# Patient Record
Sex: MALE | Race: BLACK OR AFRICAN AMERICAN
[De-identification: names, ages, dates, MRNs, and addresses within clinical notes are randomized per-mention and may not be internally consistent; named-entity substitution may affect disease eponyms.]

---

## 2019-10-22 ENCOUNTER — HOSPITAL ENCOUNTER (EMERGENCY)
Dept: HOSPITAL 72 - EMR | Age: 84
Discharge: NURSING, REHAB OR LONG-TERM CARE | End: 2019-10-22
Payer: MEDICARE

## 2019-10-22 VITALS — DIASTOLIC BLOOD PRESSURE: 51 MMHG | SYSTOLIC BLOOD PRESSURE: 127 MMHG

## 2019-10-22 VITALS — SYSTOLIC BLOOD PRESSURE: 154 MMHG | DIASTOLIC BLOOD PRESSURE: 76 MMHG

## 2019-10-22 VITALS — HEIGHT: 68 IN | BODY MASS INDEX: 21.98 KG/M2 | WEIGHT: 145 LBS

## 2019-10-22 VITALS — SYSTOLIC BLOOD PRESSURE: 131 MMHG | DIASTOLIC BLOOD PRESSURE: 57 MMHG
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
## 2019-10-22 DIAGNOSIS — M54.5: Primary | ICD-10-CM

## 2019-10-22 DIAGNOSIS — I69.854: ICD-10-CM

## 2019-10-22 DIAGNOSIS — T14.90XA: ICD-10-CM

## 2019-10-22 DIAGNOSIS — I10: ICD-10-CM

## 2019-10-22 DIAGNOSIS — R07.9: ICD-10-CM
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
## 2019-10-22 PROCEDURE — 72020 X-RAY EXAM OF SPINE 1 VIEW: CPT

## 2019-10-22 PROCEDURE — 99284 EMERGENCY DEPT VISIT MOD MDM: CPT

## 2019-10-22 NOTE — NUR
ED Nurse Note:



PT BROUGHT IN TO ER BY [facility] FROM [facility]. AOX4. PER EMS, FACILITY 
CALLED DUE TO A FALL X 2 DAYS AGO. PT STATES HE TRIPPED AND FELL AND LANDED ON 
HIS BACK. PT DENIES HEAD TRAUMA/LOC. PT DENIES ANY PAIN. ON ASSESSMENT, SKIN IS 
CLEAN, DRY, AND INTACT. NO BRUISING OR OBVIOUS DEFORMITIES NOTED. FULL ROM OF 
ALL EXTREMITIES.

## 2019-10-22 NOTE — NUR
ED Nurse Note:



[facility] AT BEDSIDE. REPORT GIVEN TO EMS. DISCHARGE PAPERWORK EXPLAINED TO PT. 
PT VERBALIZES UNDERSTANDING AND ALL QUESTIONS ANSWERED. DISCHARGE PAPERWORK 
GIVEN TO EMS. PT TAKEN BACK TO [facility]IA AMBULANCE WITH ALL BELONGINGS 
ACCOMPANIED BY EMS. VSS.

## 2019-10-22 NOTE — DIAGNOSTIC IMAGING REPORT
Indication: Reason For Exam: PAIN

 

Technique: One view of the chest, 2 views of the right ribs

 

Comparison: 8/9/2019

 

Findings: Lungs pleural spaces are clear. The heart is enlarged. The aorta is

tortuous ectatic and calcified. Upper mediastinum is unremarkable. There is mild

thoracic scoliotic deformity. No pneumothorax

 

Rib radiographs demonstrate no evidence of acute fracture.

 

Impression: Negative

## 2019-10-22 NOTE — NUR
ED Nurse Note:



[facility] CALLED FOR PT REPORT. REPORT GIVEN TO LILIANA ROA. FACILITY 
READY TO ACCEPT PT AND AWARE OF [facility] ETA TO [facility] OF 1450.

## 2019-10-22 NOTE — EMERGENCY ROOM REPORT
History of Present Illness


General


Chief Complaint:  Multiple Trauma/Fall


Source:  Patient





Present Illness


HPI


85-year-old male presents with mechanical fall 2 days prior to arrival, patient 

denied his head, he fell backward after trip, he endorses right lower back pain 

aggravated with touch alleviated by not touching it, severity is mild, no bowel 

bladder retention/incontinence, no perianal numbness patient presents for 

evaluation.


Allergies:  


Coded Allergies:  


     No Known Allergies (Unverified , 8/9/19)





Patient History


Past Medical History:  see triage record


Reviewed Nursing Documentation:  PMH: Agreed; PSxH: Agreed





Nursing Documentation-PMH


Past Medical History:  No History, Except For


Hx Cardiac Problems:  Yes - anemia


Hx Hypertension:  Yes


Hx Cancer:  No


Hx Gastrointestinal Problems:  No


Hx Neurological Problems:  Yes


Hx Cerebrovascular Accident:  Yes - left side weakness


Hx Weakness:  Yes





Review of Systems


All Other Systems:  negative except mentioned in HPI





Physical Exam





Vital Signs








  Date Time  Temp Pulse Resp B/P (MAP) Pulse Ox O2 Delivery O2 Flow Rate FiO2


 


10/22/19 10:30 98.2 87 18 126/73 (90) 94 Room Air  








Sp02 EP Interpretation:  reviewed, normal


General Appearance:  well appearing, no apparent distress, alert


Head:  normocephalic, atraumatic


Eyes:  bilateral eye PERRL, bilateral eye EOMI


ENT:  uvula midline, moist mucus membranes


Neck:  supple, thyroid normal, no bony tend, supple/symm/no masses


Respiratory:  lungs clear, no respiratory distress, no retraction, no accessory 

muscle use


Cardiovascular #1:  normal peripheral pulses, regular rate, rhythm, no edema, 

no gallop, no murmur


Gastrointestinal:  non tender, soft, no guarding, no rebound


Musculoskeletal:  normal inspection, other - No midline tenderness no step-offs

, tenderness to palpation right paraspinal, right lower back


Neurologic:  alert, oriented x3


Psychiatric:  mood/affect normal


Skin:  no rash, warm/dry





Medical Decision Making


Diagnostic Impression:  


 Primary Impression:  


 Multiple injuries due to trauma


 Additional Impression:  


 Fall


 Qualified Codes:  W19.XXXA - Unspecified fall, initial encounter


ER Course


85-year-old male presents with mechanical fall, will evaluate for possible 

fractures patient did not hit his head no e/o of trauma to head, no c spine or 

midline back tenderness/step offs


Patient with negative XRs of chest, ribs, and lumbarsacral spine.


Patient states he is in no pain and is fine.


Dispo back to nursing home


Other X-Ray Diagnostic Results


Other X-Ray Diagnostic Results #1:  


   X-Ray ordered:  Lumbar Sacral


   # of Views/Limited Vs Complete:  3 View


   Indication:  Pain


   EP Interpretation:  Yes


   Interpretation:  no fractures


   Impression:  No acute disease


   Electronically Signed by:  Howie Osborne MD


Other X-Ray Diagnostic Results #2:  


   X-Ray ordered:  XR chest PA, Rib Series Right


   # of Views/Limited Vs Complete:  Complete


   Indication:  Pain


   EP Interpretation:  Yes


   Interpretation:  no fractures


   Impression:  No acute disease


   Electronically Signed by:  Howie Osborne MD





Last Vital Signs








  Date Time  Temp Pulse Resp B/P (MAP) Pulse Ox O2 Delivery O2 Flow Rate FiO2


 


10/22/19 10:45  89 17   Room Air  


 


10/22/19 10:45 98.4   154/76 98   








Disposition:  XFER SNF


Condition:  Stable


Referrals:  


Frandy Vuong MD (PCP)


Patient Instructions:  Contusion, Easy-to-Read, Fall Prevention in Hospitals, 

Adult





Additional Instructions:  


The patient was provided with discharge instructions, notified to follow-up 

with a primary care doctor and or specialist in the next 24-48 hours, and to 

return to the ED if they have worsening of their symptoms. 





Please note that this report is being documented using DRAGON technology.


This can lead to erroneous entry secondary to incorrect interpretation by the 

dictating instrument.











Howie Osborne MD Oct 22, 2019 12:44

## 2019-11-18 ENCOUNTER — HOSPITAL ENCOUNTER (INPATIENT)
Dept: HOSPITAL 72 - EMR | Age: 84
LOS: 3 days | Discharge: NURSING, REHAB OR LONG-TERM CARE | DRG: 57 | End: 2019-11-21
Payer: MEDICARE

## 2019-11-18 VITALS — SYSTOLIC BLOOD PRESSURE: 197 MMHG | DIASTOLIC BLOOD PRESSURE: 87 MMHG

## 2019-11-18 VITALS — HEIGHT: 70 IN | WEIGHT: 138.44 LBS | BODY MASS INDEX: 19.82 KG/M2

## 2019-11-18 VITALS — SYSTOLIC BLOOD PRESSURE: 197 MMHG | DIASTOLIC BLOOD PRESSURE: 88 MMHG

## 2019-11-18 VITALS — SYSTOLIC BLOOD PRESSURE: 158 MMHG | DIASTOLIC BLOOD PRESSURE: 80 MMHG

## 2019-11-18 VITALS — SYSTOLIC BLOOD PRESSURE: 171 MMHG | DIASTOLIC BLOOD PRESSURE: 85 MMHG

## 2019-11-18 DIAGNOSIS — G93.40: ICD-10-CM

## 2019-11-18 DIAGNOSIS — I69.354: ICD-10-CM

## 2019-11-18 DIAGNOSIS — I10: ICD-10-CM

## 2019-11-18 DIAGNOSIS — F02.81: ICD-10-CM

## 2019-11-18 DIAGNOSIS — G30.9: Primary | ICD-10-CM

## 2019-11-18 LAB
ADD MANUAL DIFF: YES
ALBUMIN SERPL-MCNC: 4 G/DL (ref 3.4–5)
ALBUMIN/GLOB SERPL: 0.9 {RATIO} (ref 1–2.7)
ALP SERPL-CCNC: 74 U/L (ref 46–116)
ALT SERPL-CCNC: 19 U/L (ref 12–78)
AMMONIA PLAS-SCNC: 15 UMOL/L (ref 11–32)
ANION GAP SERPL CALC-SCNC: 18 MMOL/L (ref 5–15)
APPEARANCE UR: CLEAR
APTT PPP: (no result) S
AST SERPL-CCNC: 22 U/L (ref 15–37)
BILIRUB SERPL-MCNC: 0.4 MG/DL (ref 0.2–1)
BUN SERPL-MCNC: 30 MG/DL (ref 7–18)
CALCIUM SERPL-MCNC: 9.4 MG/DL (ref 8.5–10.1)
CHLORIDE SERPL-SCNC: 109 MMOL/L (ref 98–107)
CO2 SERPL-SCNC: 20 MMOL/L (ref 21–32)
CREAT SERPL-MCNC: 1.5 MG/DL (ref 0.55–1.3)
ERYTHROCYTE [DISTWIDTH] IN BLOOD BY AUTOMATED COUNT: 12 % (ref 11.6–14.8)
GLOBULIN SER-MCNC: 4.3 G/DL
GLUCOSE UR STRIP-MCNC: NEGATIVE MG/DL
HCT VFR BLD CALC: 34.6 % (ref 42–52)
HGB BLD-MCNC: 11.2 G/DL (ref 14.2–18)
KETONES UR QL STRIP: NEGATIVE
LEUKOCYTE ESTERASE UR QL STRIP: NEGATIVE
MCV RBC AUTO: 83 FL (ref 80–99)
NITRITE UR QL STRIP: NEGATIVE
PH UR STRIP: 5 [PH] (ref 4.5–8)
PLATELET # BLD: 233 K/UL (ref 150–450)
POTASSIUM SERPL-SCNC: 4.3 MMOL/L (ref 3.5–5.1)
PROT UR QL STRIP: NEGATIVE
RBC # BLD AUTO: 4.2 M/UL (ref 4.7–6.1)
SODIUM SERPL-SCNC: 147 MMOL/L (ref 136–145)
SP GR UR STRIP: 1.02 (ref 1–1.03)
UROBILINOGEN UR-MCNC: NORMAL MG/DL (ref 0–1)
WBC # BLD AUTO: 12.9 K/UL (ref 4.8–10.8)

## 2019-11-18 PROCEDURE — 80307 DRUG TEST PRSMV CHEM ANLYZR: CPT

## 2019-11-18 PROCEDURE — 82607 VITAMIN B-12: CPT

## 2019-11-18 PROCEDURE — 82746 ASSAY OF FOLIC ACID SERUM: CPT

## 2019-11-18 PROCEDURE — 85610 PROTHROMBIN TIME: CPT

## 2019-11-18 PROCEDURE — 80053 COMPREHEN METABOLIC PANEL: CPT

## 2019-11-18 PROCEDURE — 80048 BASIC METABOLIC PNL TOTAL CA: CPT

## 2019-11-18 PROCEDURE — 83615 LACTATE (LD) (LDH) ENZYME: CPT

## 2019-11-18 PROCEDURE — 85651 RBC SED RATE NONAUTOMATED: CPT

## 2019-11-18 PROCEDURE — 83540 ASSAY OF IRON: CPT

## 2019-11-18 PROCEDURE — 82270 OCCULT BLOOD FECES: CPT

## 2019-11-18 PROCEDURE — 85730 THROMBOPLASTIN TIME PARTIAL: CPT

## 2019-11-18 PROCEDURE — 71045 X-RAY EXAM CHEST 1 VIEW: CPT

## 2019-11-18 PROCEDURE — 84443 ASSAY THYROID STIM HORMONE: CPT

## 2019-11-18 PROCEDURE — 87081 CULTURE SCREEN ONLY: CPT

## 2019-11-18 PROCEDURE — 85025 COMPLETE CBC W/AUTO DIFF WBC: CPT

## 2019-11-18 PROCEDURE — 85060 BLOOD SMEAR INTERPRETATION: CPT

## 2019-11-18 PROCEDURE — 83550 IRON BINDING TEST: CPT

## 2019-11-18 PROCEDURE — 85044 MANUAL RETICULOCYTE COUNT: CPT

## 2019-11-18 PROCEDURE — 36415 COLL VENOUS BLD VENIPUNCTURE: CPT

## 2019-11-18 PROCEDURE — 81003 URINALYSIS AUTO W/O SCOPE: CPT

## 2019-11-18 PROCEDURE — 82140 ASSAY OF AMMONIA: CPT

## 2019-11-18 PROCEDURE — 99285 EMERGENCY DEPT VISIT HI MDM: CPT

## 2019-11-18 PROCEDURE — 85007 BL SMEAR W/DIFF WBC COUNT: CPT

## 2019-11-18 PROCEDURE — 93005 ELECTROCARDIOGRAM TRACING: CPT

## 2019-11-18 PROCEDURE — 80061 LIPID PANEL: CPT

## 2019-11-18 PROCEDURE — 82378 CARCINOEMBRYONIC ANTIGEN: CPT

## 2019-11-18 PROCEDURE — 96372 THER/PROPH/DIAG INJ SC/IM: CPT

## 2019-11-18 NOTE — EMERGENCY ROOM REPORT
History of Present Illness


General


Chief Complaint:  General Complaint


Source:  Patient





Present Illness


HPI


85-year-old male presents ED for evaluation.  EMS from skilled nursing 

facility.  Nursing staff reported increased agitation x1 day and not taking his 

medications as directed.  On arrival patient screaming and yelling.  Documented 

history of psychosis.  Patient denies hearing voices.  Denies SI or HI.  Denies 

fevers or chills.  Denies chest pain or shortness of breath.  No other 

aggravating relieving factors.  Denies any other associated symptoms


Allergies:  


Coded Allergies:  


     No Known Allergies (Unverified , 8/9/19)





Patient History


Past Medical History:  HTN, CVA/TIA


Past Surgical History:  none


Pertinent Family History:  none


Social History:  Denies: smoking, alcohol use, drug use


Immunizations:  UTD


Reviewed Nursing Documentation:  PMH: Agreed; PSxH: Agreed





Nursing Documentation-PMH


Past Medical History:  No History, Except For


Hx Hypertension:  Yes


Hx Cancer:  No


Hx Gastrointestinal Problems:  No


Hx Neurological Problems:  Yes


Hx Cerebrovascular Accident:  Yes - left side weakness


Hx Weakness:  Yes





Review of Systems


All Other Systems:  negative except mentioned in HPI





Physical Exam





Vital Signs








  Date Time  Temp Pulse Resp B/P (MAP) Pulse Ox O2 Delivery O2 Flow Rate FiO2


 


11/18/19 10:28 97.7 100 16 169/81 (110) 94 Room Air  








Sp02 EP Interpretation:  reviewed, normal


General Appearance:  alert, GCS 15, non-toxic, thin, other - agitated


Head:  normocephalic, atraumatic


Eyes:  bilateral eye normal inspection, bilateral eye PERRL


ENT:  hearing grossly normal, normal pharynx, no angioedema, normal voice


Neck:  full range of motion, supple/symm/no masses


Respiratory:  chest non-tender, lungs clear, normal breath sounds, speaking 

full sentences


Cardiovascular #1:  regular rate, rhythm, no edema


Cardiovascular #2:  2+ carotid (R), 2+ carotid (L), 2+ radial (R), 2+ radial (L)

, 2+ dorsalis pedis (R), 2+ dorsalis pedis (L)


Gastrointestinal:  normal bowel sounds, non tender, soft, non-distended, no 

guarding, no rebound


Rectal:  deferred


Genitourinary:  normal inspection, no CVA tenderness


Musculoskeletal:  back normal, gait/station normal, normal range of motion, non-

tender


Neurologic:  alert, motor strength/tone normal, sensory intact, speech normal, 

other - agitated


Psychiatric:  no suicidal/homicidal ideation, no delusions, other - agitated


Reflexes:  3+ bicep (R), 3+ bicep (L), 3+ tricep (R), 3+ tricep (L), 3+ knee (R)

, 3+ knee (L)


Lymphatic:  no adenopathy





Medical Decision Making


Diagnostic Impression:  


 Primary Impression:  


 Acute encephalopathy


 Additional Impression:  


 Renal failure (ARF), acute on chronic


 Qualified Codes:  N17.9 - Acute kidney failure, unspecified; N18.9 - Chronic 

kidney disease, unspecified


ER Course


Hospital Course 


86 yo M presents to ED with increased agitation.





Differential diagnoses include: Pneumonia, UTI, dehydration, psychosis





Clinical course


Patient placed on stretcher.  On cardiac monitor with stable vitals are ED 

course.  After initial history and physical, I ordered labs,  chest x-ray, UA





Initially very agitated, not allowing for lab draw and treatment.  Patient 

given Haldol and Ativan for sedation.





Labs - BUN/Cr elevated, Na 147, no leukocytosis, UA negative


CXR - no acute process 





There is a documented diagnosis of psychosis on the SNF paperwork.  However 

patient does not appear to be on any antipsychotics





Case discussed with Dr Vuong  and they agreed to admit patient to their service 

for further care and support





I feel this is a highly complex case requiring extensive working including EKG/

Rhythm strip, Xray/CT/US, Blood/urine lab work, repeat exams while in ED, and 

administration of strong opiates/narcotics for pain control, admission to 

hospital or close patient follow up.  





Diagnosis - acute encephalopathy, acute on chronic renal failure 





Patient admitted to floor in serious condition





Labs








Test


  11/18/19


11:45


 


White Blood Count


  12.9 K/UL


(4.8-10.8)


 


Red Blood Count


  4.20 M/UL


(4.70-6.10)


 


Hemoglobin


  11.2 G/DL


(14.2-18.0)


 


Hematocrit


  34.6 %


(42.0-52.0)


 


Mean Corpuscular Volume 83 FL (80-99) 


 


Mean Corpuscular Hemoglobin


  26.6 PG


(27.0-31.0)


 


Mean Corpuscular Hemoglobin


Concent 32.2 G/DL


(32.0-36.0)


 


Red Cell Distribution Width


  12.0 %


(11.6-14.8)


 


Platelet Count


  233 K/UL


(150-450)


 


Mean Platelet Volume


  5.4 FL


(6.5-10.1)


 


Neutrophils (%) (Auto)  % (45.0-75.0) 


 


Lymphocytes (%) (Auto)  % (20.0-45.0) 


 


Monocytes (%) (Auto)  % (1.0-10.0) 


 


Eosinophils (%) (Auto)  % (0.0-3.0) 


 


Basophils (%) (Auto)  % (0.0-2.0) 


 


Differential Total Cells


Counted 100 


 


 


Neutrophils % (Manual) 89 % (45-75) 


 


Lymphocytes % (Manual) 6 % (20-45) 


 


Monocytes % (Manual) 5 % (1-10) 


 


Eosinophils % (Manual) 0 % (0-3) 


 


Basophils % (Manual) 0 % (0-2) 


 


Band Neutrophils 0 % (0-8) 


 


Platelet Estimate Adequate 


 


Platelet Morphology Normal 


 


Red Blood Cell Morphology Normal 


 


Urine Color Pale yellow 


 


Urine Appearance Clear 


 


Urine pH 5 (4.5-8.0) 


 


Urine Specific Gravity


  1.020


(1.005-1.035)


 


Urine Protein


  Negative


(NEGATIVE)


 


Urine Glucose (UA)


  Negative


(NEGATIVE)


 


Urine Ketones


  Negative


(NEGATIVE)


 


Urine Blood


  Negative


(NEGATIVE)


 


Urine Nitrite


  Negative


(NEGATIVE)


 


Urine Bilirubin


  Negative


(NEGATIVE)


 


Urine Urobilinogen


  Normal MG/DL


(0.0-1.0)


 


Urine Leukocyte Esterase


  Negative


(NEGATIVE)


 


Sodium Level


  147 MMOL/L


(136-145)


 


Potassium Level


  4.3 MMOL/L


(3.5-5.1)


 


Chloride Level


  109 MMOL/L


()


 


Carbon Dioxide Level


  20 MMOL/L


(21-32)


 


Anion Gap


  18 mmol/L


(5-15)


 


Blood Urea Nitrogen


  30 mg/dL


(7-18)


 


Creatinine


  1.5 MG/DL


(0.55-1.30)


 


Estimat Glomerular Filtration


Rate  mL/min (>60) 


 


 


Glucose Level


  105 MG/DL


()


 


Calcium Level


  9.4 MG/DL


(8.5-10.1)


 


Total Bilirubin


  0.4 MG/DL


(0.2-1.0)


 


Aspartate Amino Transf


(AST/SGOT) 22 U/L (15-37) 


 


 


Alanine Aminotransferase


(ALT/SGPT) 19 U/L (12-78) 


 


 


Alkaline Phosphatase


  74 U/L


()


 


Total Protein


  8.3 G/DL


(6.4-8.2)


 


Albumin


  4.0 G/DL


(3.4-5.0)


 


Globulin 4.3 g/dL 


 


Albumin/Globulin Ratio 0.9 (1.0-2.7) 


 


Salicylates Level


  1.0 ug/mL


(2.8-20)


 


Urine Opiates Screen


  Negative


(NEGATIVE)


 


Acetaminophen Level


  < 2 MCG/ML


(10-30)


 


Urine Barbiturates Screen


  Negative


(NEGATIVE)


 


Phencyclidine (PCP) Screen


  Negative


(NEGATIVE)


 


Urine Amphetamines Screen


  Negative


(NEGATIVE)


 


Urine Benzodiazepines Screen


  Negative


(NEGATIVE)


 


Urine Cocaine Screen


  Negative


(NEGATIVE)


 


Urine Marijuana (THC) Screen


  Negative


(NEGATIVE)


 


Serum Alcohol < 3 mg/dL 











Last Vital Signs








  Date Time  Temp Pulse Resp B/P (MAP) Pulse Ox O2 Delivery O2 Flow Rate FiO2


 


11/18/19 10:35 97.7  16 158/80 94 Room Air  


 


11/18/19 10:35  98      








Status:  improved


Disposition:  ADMITTED AS INPATIENT


Condition:  Serious


Referrals:  


Frandy Vuong MD (PCP)











Florencio Garcia MD Nov 18, 2019 13:05

## 2019-11-18 NOTE — HISTORY AND PHYSICAL REPORT
DATE OF ADMISSION:  11/18/2019

CHIEF COMPLAINT:  The patient is an 85-year-old  male, who

presents with a chief complaint of altered mental status and increased

agitation.



HISTORY OF PRESENT ILLNESS:  The patient was admitted to [facility] from 09/08/2019 to 09/16/2019.  The patient was admitted with

dislocated finger.  Please see history and physical and discharge summary

dictated at that time.



The patient is a resident of [facility].  According to staff at [facility],

the patient has become increasingly agitated over the last day.  The

patient has had altered.  The patient was transported to [place] emergency

room for evaluation.  The patient is admitted with increased agitation and

altered mental status to rule out urinary tract infection versus

psychosis.



REVIEW OF SYSTEMS:  Unable to assess secondary to the patient's

agitation.



PAST MEDICAL HISTORY:  Significant for:



1. Cerebrovascular disease, status post cerebrovascular accident.

2. Left hemiparesis.

3. Hypertension.



PAST SURGICAL HISTORY:  Significant for appendectomy.



CURRENT MEDICATIONS:

1. Amlodipine 10 mg p.o. daily.

2. Clonidine 0.1 mg p.o. q.6 h. p.r.n.

3. Folic acid 1 mg p.o. daily.

4. Hydralazine 100 mg p.o. three times daily.

5. Metoprolol 12.5 mg p.o. twice daily.

6. Nifedipine 30 mg p.o. q.8 h.

7. Zofran 4 mg p.o. q.6 h. p.r.n. nausea and vomiting.

8. Risperdal 1.5 mg p.o. twice daily.



ALLERGIES:  No known drug allergies.



SOCIAL HISTORY:  The patient is single and is a resident of [facility]

[facility].  The patient

denies tobacco or alcohol use.



PHYSICAL EXAMINATION:

VITAL SIGNS:  Temperature 97.7, respirations 16, pulse 100, blood pressure

169/81.

GENERAL:  The patient is a thin-appearing  male, who is

agitated.

HEENT:  Eyes, pupils are equal and responsive to light and accommodation.

Extraocular movements are intact.

NECK:  Supple without lymphadenopathy.

CHEST:  Lungs are clear to auscultation bilaterally without wheezes or

rales.

CARDIOVASCULAR:  Regular rhythm and rate.  S1 and S2 are normal without

murmurs, rubs, or gallops.

ABDOMEN:  Soft, nontender, and nondistended.  Positive bowel sounds.  No

evidence of hepatosplenomegaly.  Currently, no rebound or guarding

noted.

EXTREMITIES:  Negative for clubbing, cyanosis, or edema.

RECTAL/GENITAL:  Not performed.

NEUROLOGIC:  Cranial nerves II through XII are grossly intact without focal

deficits.



LABORATORY STUDIES:  WBC 12.9, hemoglobin 11.2, hematocrit 34.6, and

platelets 233,000.  Sodium 147, potassium 4.3, chloride 109, CO2 20, BUN

30, creatinine 1.5, glucose 105.  Urinalysis was within normal limits.



ASSESSMENT:  This is an 85-year-old  male.



1. Altered mental status.

2. Increased agitation.

3. Alzheimer's dementia with psychotic features.

4. Hypertension.

5. Cerebrovascular disease.

6. Left hemiplegia.



TREATMENT:

1. Altered mental status/increased agitation.  A psychiatric

consultation has been obtained with Dr. Randall.  We will follow

recommendations of Psychiatry.

2. Dementia with psychotic features.  As above, a psychiatric

consultation has been obtained with Dr. Randall.  We will follow

recommendations of Psychiatry.

3. Hypertension.  Continue amlodipine as above.

4. Cerebrovascular disease/left hemiplegia.









  ______________________________________________

  Piero Harman M.D. DR:  SAGRARIO

D:  11/18/2019 19:02

T:  11/18/2019 19:13

JOB#:  [id number]

CC:

## 2019-11-18 NOTE — HISTORY & PHYSICAL
History and Physical


History & Physicial


Dictated for Int Med-Dr Vuong no. [id number]











Piero Harman MD Nov 18, 2019 19:01

## 2019-11-18 NOTE — DIAGNOSTIC IMAGING REPORT
Indication: Cough

 

Technique: One view of the chest

 

Comparison: 8/9/2019 and 10/22/2019

 

Findings: Ovoid opacity projected in the left upper lung probably represents a

prominent first costochondral junction and appears somewhat similar to the previous

study. Inspiration is suboptimal. Lungs and pleural spaces otherwise grossly clear.

The heart is upper limits normal in size. The aorta is tortuous and calcified.

 

Impression: Ovoid opacity in the left upper lung, probably a prominent first

costochondral junction and equivocally similar to the prior study but more

conspicuous; mass lesion less likely but not completely excludable.

 

No acute process otherwise

 

Dr. Vuong notified of findings previously

## 2019-11-19 VITALS — DIASTOLIC BLOOD PRESSURE: 63 MMHG | SYSTOLIC BLOOD PRESSURE: 105 MMHG

## 2019-11-19 VITALS — DIASTOLIC BLOOD PRESSURE: 78 MMHG | SYSTOLIC BLOOD PRESSURE: 162 MMHG

## 2019-11-19 VITALS — DIASTOLIC BLOOD PRESSURE: 57 MMHG | SYSTOLIC BLOOD PRESSURE: 123 MMHG

## 2019-11-19 VITALS — SYSTOLIC BLOOD PRESSURE: 155 MMHG | DIASTOLIC BLOOD PRESSURE: 70 MMHG

## 2019-11-19 VITALS — DIASTOLIC BLOOD PRESSURE: 68 MMHG | SYSTOLIC BLOOD PRESSURE: 123 MMHG

## 2019-11-19 VITALS — DIASTOLIC BLOOD PRESSURE: 75 MMHG | SYSTOLIC BLOOD PRESSURE: 159 MMHG

## 2019-11-19 LAB
ADD MANUAL DIFF: NO
ALBUMIN SERPL-MCNC: 3.3 G/DL (ref 3.4–5)
ALBUMIN/GLOB SERPL: 0.8 {RATIO} (ref 1–2.7)
ALP SERPL-CCNC: 67 U/L (ref 46–116)
ALT SERPL-CCNC: 16 U/L (ref 12–78)
ANION GAP SERPL CALC-SCNC: 12 MMOL/L (ref 5–15)
AST SERPL-CCNC: 17 U/L (ref 15–37)
BASOPHILS NFR BLD AUTO: 1.1 % (ref 0–2)
BILIRUB SERPL-MCNC: 0.7 MG/DL (ref 0.2–1)
BUN SERPL-MCNC: 25 MG/DL (ref 7–18)
CALCIUM SERPL-MCNC: 8.8 MG/DL (ref 8.5–10.1)
CHLORIDE SERPL-SCNC: 109 MMOL/L (ref 98–107)
CHOLEST SERPL-MCNC: 169 MG/DL (ref ?–200)
CO2 SERPL-SCNC: 24 MMOL/L (ref 21–32)
CREAT SERPL-MCNC: 1.3 MG/DL (ref 0.55–1.3)
EOSINOPHIL NFR BLD AUTO: 4.4 % (ref 0–3)
ERYTHROCYTE [DISTWIDTH] IN BLOOD BY AUTOMATED COUNT: 11.8 % (ref 11.6–14.8)
GLOBULIN SER-MCNC: 3.9 G/DL
HCT VFR BLD CALC: 31.7 % (ref 42–52)
HDLC SERPL-MCNC: 46 MG/DL (ref 40–60)
HGB BLD-MCNC: 10.3 G/DL (ref 14.2–18)
LYMPHOCYTES NFR BLD AUTO: 12.9 % (ref 20–45)
MCV RBC AUTO: 81 FL (ref 80–99)
MONOCYTES NFR BLD AUTO: 9.3 % (ref 1–10)
NEUTROPHILS NFR BLD AUTO: 72.4 % (ref 45–75)
PLATELET # BLD: 208 K/UL (ref 150–450)
POTASSIUM SERPL-SCNC: 3.8 MMOL/L (ref 3.5–5.1)
RBC # BLD AUTO: 3.9 M/UL (ref 4.7–6.1)
SODIUM SERPL-SCNC: 144 MMOL/L (ref 136–145)
TRIGL SERPL-MCNC: 35 MG/DL (ref 30–150)
WBC # BLD AUTO: 6.8 K/UL (ref 4.8–10.8)

## 2019-11-19 RX ADMIN — LISINOPRIL SCH MG: 20 TABLET ORAL at 12:01

## 2019-11-19 NOTE — CONSULTATION
History of Present Illness


General


Chief Complaint:  General Complaint


Reason for Consultation:  inpatient management





Present Illness


Allergies:  


Coded Allergies:  


     No Known Allergies (Unverified , 8/9/19)





Medication History


Scheduled


Amlodipine Besylate* (Amlodipine Besylate*), 10 MG ORAL DAILY, (Reported)


Clonidine Hcl* (Catapres*), 0.1 MG ORAL EVERY 6 HOURS, (Reported)


Folic Acid* (Folic Acid*), 1 MG ORAL DAILY, (Reported)


Hydralazine Hcl* (Hydralazine Hcl*), 100 MG ORAL EVERY 8 HOURS, (Reported)


Metoprolol Tartrate* (Metoprolol Tartrate*), 12.5 MG ORAL EVERY 12 HOURS, (

Reported)


Nifedipine (Nifedipine*), 30 MG ORAL EVERY 8 HOURS, (Reported)


Risperidone* (Risperdal*), 1.5 MG PO Q12HR, (Reported)





Scheduled PRN


Acetaminophen* (Tylenol Extra Strength*), 325 MG ORAL Q6H PRN for Mild Pain/

Temp > 100.5, (Reported)


Ondansetron* (Zofran*), Unknown Dose ORAL Q6H PRN for Nausea & Vomiting, (

Reported)


Polyethylene Glycol 3350* (Polyethylene Glycol 3350*), 17 GM ORAL BEDTIME PRN 

for Constipation, (Reported)





Patient History


Healthcare decision maker





Resuscitation status


Full Code


Advanced Directive on File








Past Medical/Surgical History


Past Medical/Surgical History:  


(1) History of hypertension


(2) History of CVA (cerebrovascular accident)





Review of Systems


Constitutional:  Reports: no symptoms


All Other Systems:  negative except mentioned in HPI





Physical Exam


General Appearance:  thin


Lines, tubes and drains:  peripheral


HEENT:  normocephalic, atraumatic


Neck:  non-tender, normal alignment


Respiratory/Chest:  chest wall non-tender, lungs clear


Cardiovascular/Chest:  normal peripheral pulses


Abdomen:  normal bowel sounds


Genitourinary/Rectal:  normal genital exam, normal rectal exam


Skin Exam:  normal pigmentation


Neurologic:  CNs II-XII grossly normal





Last 24 Hour Vital Signs








  Date Time  Temp Pulse Resp B/P (MAP) Pulse Ox O2 Delivery O2 Flow Rate FiO2


 


11/19/19 12:01    159/75    


 


11/19/19 10:29  49  159/75    


 


11/19/19 10:12      Room Air  


 


11/19/19 08:43 96.8 60 14 162/78 (106) 98   


 


11/19/19 08:00 97.6 63 18 162/78 (106) 100   


 


11/19/19 04:00 97.3 62 20 105/63 (77) 98   


 


11/19/19 00:00 97.5 56 17 155/70 (98) 98   


 


11/18/19 21:00      Room Air  


 


11/18/19 20:32  62  171/85    


 


11/18/19 20:00 98.2 62 20 171/85 (113) 99   


 


11/18/19 16:00 97.7 75 18 197/88 (124) 96   


 


11/18/19 14:59      Room Air  


 


11/18/19 14:56 98.1 88 18 197/87 (123) 97   


 


11/18/19 14:20 98.7  16 170/70 94 Room Air  

















Intake and Output  


 


 11/18/19 11/19/19





 19:00 07:00


 


Intake Total 860 ml 


 


Output Total 1400 ml 350 ml


 


Balance -540 ml -350 ml


 


  


 


Intake Oral 360 ml 


 


IV Total 500 ml 


 


Output Urine Total 1400 ml 350 ml


 


# Bowel Movements 1 











Laboratory Tests








Test


  11/19/19


05:45


 


White Blood Count


  6.8 K/UL


(4.8-10.8)


 


Red Blood Count


  3.90 M/UL


(4.70-6.10)  L


 


Hemoglobin


  10.3 G/DL


(14.2-18.0)  L


 


Hematocrit


  31.7 %


(42.0-52.0)  L


 


Mean Corpuscular Volume 81 FL (80-99)  


 


Mean Corpuscular Hemoglobin


  26.5 PG


(27.0-31.0)  L


 


Mean Corpuscular Hemoglobin


Concent 32.6 G/DL


(32.0-36.0)


 


Red Cell Distribution Width


  11.8 %


(11.6-14.8)


 


Platelet Count


  208 K/UL


(150-450)


 


Mean Platelet Volume


  5.5 FL


(6.5-10.1)  L


 


Neutrophils (%) (Auto)


  72.4 %


(45.0-75.0)


 


Lymphocytes (%) (Auto)


  12.9 %


(20.0-45.0)  L


 


Monocytes (%) (Auto)


  9.3 %


(1.0-10.0)


 


Eosinophils (%) (Auto)


  4.4 %


(0.0-3.0)  H


 


Basophils (%) (Auto)


  1.1 %


(0.0-2.0)


 


Sodium Level


  144 MMOL/L


(136-145)


 


Potassium Level


  3.8 MMOL/L


(3.5-5.1)


 


Chloride Level


  109 MMOL/L


()  H


 


Carbon Dioxide Level


  24 MMOL/L


(21-32)


 


Anion Gap


  12 mmol/L


(5-15)


 


Blood Urea Nitrogen


  25 mg/dL


(7-18)  H


 


Creatinine


  1.3 MG/DL


(0.55-1.30)


 


Estimat Glomerular Filtration


Rate  mL/min (>60)  


 


 


Glucose Level


  77 MG/DL


()


 


Calcium Level


  8.8 MG/DL


(8.5-10.1)


 


Total Bilirubin


  0.7 MG/DL


(0.2-1.0)


 


Aspartate Amino Transf


(AST/SGOT) 17 U/L (15-37)


 


 


Alanine Aminotransferase


(ALT/SGPT) 16 U/L (12-78)


 


 


Alkaline Phosphatase


  67 U/L


()


 


Total Protein


  7.2 G/DL


(6.4-8.2)


 


Albumin


  3.3 G/DL


(3.4-5.0)  L


 


Globulin 3.9 g/dL  


 


Albumin/Globulin Ratio


  0.8 (1.0-2.7)


L


 


Triglycerides Level


  35 MG/DL


()


 


Cholesterol Level


  169 MG/DL (<


200)


 


LDL Cholesterol


  119 mg/dL


(<100)  H


 


HDL Cholesterol


  46 MG/DL


(40-60)


 


Cholesterol/HDL Ratio 3.7 (3.3-4.4)  


 


Thyroid Stimulating Hormone


(TSH) 7.206 uiU/mL


(0.358-3.740)








Height (Feet):  5


Height (Inches):  10.00


Weight (Pounds):  136


Medications





Current Medications








 Medications


  (Trade)  Dose


 Ordered  Sig/Arlen


 Route


 PRN Reason  Start Time


 Stop Time Status Last Admin


Dose Admin


 


 Acetaminophen


  (Tylenol)  650 mg  Q4H  PRN


 ORAL


 fever  11/18/19 12:15


 12/18/19 12:14   


 


 


 Amlodipine


 Besylate


  (Norvasc)  10 mg  DAILY


 ORAL


   11/19/19 09:00


 12/19/19 08:59  11/19/19 10:29


 


 


 Clonidine HCl


  (Catapres Tab)  0.1 mg  Q6H  PRN


 ORAL


 For High Blood Pressure  11/18/19 16:00


 12/18/19 15:59   


 


 


 Dextrose


  (Dextrose 50%)  25 ml  Q30M  PRN


 IV


 Hypoglycemia  11/18/19 12:15


 12/18/19 12:14   


 


 


 Dextrose


  (Dextrose 50%)  50 ml  Q30M  PRN


 IV


 Hypoglycemia  11/18/19 12:15


 12/18/19 12:14   


 


 


 Lisinopril


  (Prinivil)  20 mg  DAILY


 ORAL


   11/19/19 10:30


 12/19/19 10:29  11/19/19 12:01


 


 


 Lorazepam


  (Ativan 2mg/ml


 1ml)  0.5 mg  Q4H  PRN


 IV


 For Anxiety  11/18/19 12:15


 11/25/19 12:14   


 


 


 Morphine Sulfate


  (Morphine


 Sulfate)  1 mg  Q4H  PRN


 IVP


 For Pain  11/18/19 12:15


 11/25/19 12:14   


 


 


 Ondansetron HCl


  (Zofran)  4 mg  Q6H  PRN


 IVP


 Nausea & Vomiting  11/18/19 12:15


 12/18/19 12:14   


 


 


 Polyethylene


 Glycol


  (Miralax)  17 gm  HSPRN  PRN


 ORAL


 Constipation  11/18/19 12:15


 12/18/19 12:14   


 


 


 Risperidone


  (RisperDAL)  1.5 mg  Q12HR


 ORAL


   11/18/19 21:00


 12/18/19 20:59  11/19/19 10:09


 


 


 Zolpidem Tartrate


  (Ambien)  5 mg  HSPRN  PRN


 ORAL


 Insomnia  11/18/19 12:15


 11/25/19 12:14   


 











Assessment/Plan


Problem List:  


(1) Acute encephalopathy


ICD Codes:  G93.40 - Encephalopathy, unspecified


SNOMED:  36531033, [id number]


(2) History of hypertension


ICD Codes:  Z86.79 - Personal history of other diseases of the circulatory 

system


SNOMED:  884022961


(3) History of CVA (cerebrovascular accident)


ICD Codes:  Z86.73 - Personal history of transient ischemic attack (TIA), and 

cerebral infarction without residual deficits


SNOMED:  872099020


Assessment/Plan:


resume psychiatric meds


monitor BP


pt/ot


hold beta blocker 


dvt prophylaxis.











Kolton Tristan MD Nov 19, 2019 12:28

## 2019-11-20 VITALS — SYSTOLIC BLOOD PRESSURE: 127 MMHG | DIASTOLIC BLOOD PRESSURE: 58 MMHG

## 2019-11-20 VITALS — SYSTOLIC BLOOD PRESSURE: 152 MMHG | DIASTOLIC BLOOD PRESSURE: 75 MMHG

## 2019-11-20 VITALS — DIASTOLIC BLOOD PRESSURE: 57 MMHG | SYSTOLIC BLOOD PRESSURE: 108 MMHG

## 2019-11-20 VITALS — DIASTOLIC BLOOD PRESSURE: 60 MMHG | SYSTOLIC BLOOD PRESSURE: 120 MMHG

## 2019-11-20 VITALS — DIASTOLIC BLOOD PRESSURE: 75 MMHG | SYSTOLIC BLOOD PRESSURE: 152 MMHG

## 2019-11-20 VITALS — SYSTOLIC BLOOD PRESSURE: 108 MMHG | DIASTOLIC BLOOD PRESSURE: 68 MMHG

## 2019-11-20 VITALS — DIASTOLIC BLOOD PRESSURE: 84 MMHG | SYSTOLIC BLOOD PRESSURE: 146 MMHG

## 2019-11-20 LAB
% IRON SATURATION: 23 % (ref 15–50)
ADD MANUAL DIFF: NO
ANION GAP SERPL CALC-SCNC: 6 MMOL/L (ref 5–15)
APTT BLD: 26 SEC (ref 23–33)
BASOPHILS NFR BLD AUTO: 0.9 % (ref 0–2)
BUN SERPL-MCNC: 33 MG/DL (ref 7–18)
CALCIUM SERPL-MCNC: 8.2 MG/DL (ref 8.5–10.1)
CHLORIDE SERPL-SCNC: 108 MMOL/L (ref 98–107)
CO2 SERPL-SCNC: 27 MMOL/L (ref 21–32)
CREAT SERPL-MCNC: 1.7 MG/DL (ref 0.55–1.3)
EOSINOPHIL NFR BLD AUTO: 3.8 % (ref 0–3)
ERYTHROCYTE [DISTWIDTH] IN BLOOD BY AUTOMATED COUNT: 12.9 % (ref 11.6–14.8)
HCT VFR BLD CALC: 30.7 % (ref 42–52)
HGB BLD-MCNC: 9.9 G/DL (ref 14.2–18)
INR PPP: 1 (ref 0.9–1.1)
IRON SERPL-MCNC: 48 UG/DL (ref 50–175)
LDH SERPL L TO P-CCNC: 174 U/L (ref 81–234)
LYMPHOCYTES NFR BLD AUTO: 13 % (ref 20–45)
MCV RBC AUTO: 83 FL (ref 80–99)
MONOCYTES NFR BLD AUTO: 8.5 % (ref 1–10)
NEUTROPHILS NFR BLD AUTO: 73.8 % (ref 45–75)
PLATELET # BLD: 210 K/UL (ref 150–450)
POTASSIUM SERPL-SCNC: 3.8 MMOL/L (ref 3.5–5.1)
RBC # BLD AUTO: 3.71 M/UL (ref 4.7–6.1)
SODIUM SERPL-SCNC: 140 MMOL/L (ref 136–145)
TIBC SERPL-MCNC: 207 UG/DL (ref 250–450)
UNSATURATED IRON BINDING: 159 UG/DL (ref 112–346)
WBC # BLD AUTO: 7.3 K/UL (ref 4.8–10.8)

## 2019-11-20 RX ADMIN — LISINOPRIL SCH MG: 20 TABLET ORAL at 08:22

## 2019-11-20 NOTE — INTERNAL MED PROGRESS NOTE
Subjective


Date of Service:  Nov 20, 2019


Physician Name


Piero Harman


Attending Physician


Frandy Vuong MD





Current Medications








 Medications


  (Trade)  Dose


 Ordered  Sig/Arlen


 Route


 PRN Reason  Start Time


 Stop Time Status Last Admin


Dose Admin


 


 Acetaminophen


  (Tylenol)  650 mg  Q4H  PRN


 ORAL


 fever  11/18/19 12:15


 12/18/19 12:14   


 


 


 Amlodipine


 Besylate


  (Norvasc)  10 mg  DAILY


 ORAL


   11/19/19 09:00


 12/19/19 08:59  11/20/19 08:21


 


 


 Clonidine HCl


  (Catapres Tab)  0.1 mg  Q6H  PRN


 ORAL


 For High Blood Pressure  11/18/19 16:00


 12/18/19 15:59   


 


 


 Dextrose


  (Dextrose 50%)  25 ml  Q30M  PRN


 IV


 Hypoglycemia  11/18/19 12:15


 12/18/19 12:14   


 


 


 Dextrose


  (Dextrose 50%)  50 ml  Q30M  PRN


 IV


 Hypoglycemia  11/18/19 12:15


 12/18/19 12:14   


 


 


 Lisinopril


  (Prinivil)  20 mg  DAILY


 ORAL


   11/19/19 10:30


 12/19/19 10:29  11/20/19 08:22


 


 


 Lorazepam


  (Ativan 2mg/ml


 1ml)  0.5 mg  Q4H  PRN


 IV


 For Anxiety  11/18/19 12:15


 11/25/19 12:14   


 


 


 Morphine Sulfate


  (Morphine


 Sulfate)  1 mg  Q4H  PRN


 IVP


 For Pain  11/18/19 12:15


 11/25/19 12:14   


 


 


 Olanzapine


  (ZyPREXA)  5 mg  EVERY 4 HOURS  PRN


 ORAL


 agitation  11/19/19 22:45


 12/19/19 22:44   


 


 


 Ondansetron HCl


  (Zofran)  4 mg  Q6H  PRN


 IVP


 Nausea & Vomiting  11/18/19 12:15


 12/18/19 12:14   


 


 


 Polyethylene


 Glycol


  (Miralax)  17 gm  HSPRN  PRN


 ORAL


 Constipation  11/18/19 12:15


 12/18/19 12:14   


 


 


 Zolpidem Tartrate


  (Ambien)  5 mg  HSPRN  PRN


 ORAL


 Insomnia  11/18/19 12:15


 11/25/19 12:14   


 








Allergies:  


Coded Allergies:  


     No Known Allergies (Unverified , 8/9/19)


ROS Limited/Unobtainable:  Yes


Subjective


84 YO M admitted with altered mental status and increased agitation.  Cover for 

Int Med-Dr Vuong.





Objective





Last Vital Signs








  Date Time  Temp Pulse Resp B/P (MAP) Pulse Ox O2 Delivery O2 Flow Rate FiO2


 


11/20/19 09:00      Room Air  


 


11/20/19 08:24 96.8 75 14 152/75 (100) 98   











Laboratory Tests








Test


  11/20/19


07:20


 


White Blood Count


  7.3 K/UL


(4.8-10.8)


 


Red Blood Count


  3.71 M/UL


(4.70-6.10)  L


 


Hemoglobin


  9.9 G/DL


(14.2-18.0)  L


 


Hematocrit


  30.7 %


(42.0-52.0)  L


 


Mean Corpuscular Volume 83 FL (80-99)  


 


Mean Corpuscular Hemoglobin


  26.6 PG


(27.0-31.0)  L


 


Mean Corpuscular Hemoglobin


Concent 32.2 G/DL


(32.0-36.0)


 


Red Cell Distribution Width


  12.9 %


(11.6-14.8)


 


Platelet Count


  210 K/UL


(150-450)


 


Mean Platelet Volume


  5.6 FL


(6.5-10.1)  L


 


Neutrophils (%) (Auto)


  73.8 %


(45.0-75.0)


 


Lymphocytes (%) (Auto)


  13.0 %


(20.0-45.0)  L


 


Monocytes (%) (Auto)


  8.5 %


(1.0-10.0)


 


Eosinophils (%) (Auto)


  3.8 %


(0.0-3.0)  H


 


Basophils (%) (Auto)


  0.9 %


(0.0-2.0)


 


Differential Total Cells


Counted 100  


 


 


Neutrophils % (Manual) 83 % (45-75)  H


 


Lymphocytes % (Manual) 8 % (20-45)  L


 


Monocytes % (Manual) 5 % (1-10)  


 


Eosinophils % (Manual) 3 % (0-3)  


 


Basophils % (Manual) 1 % (0-2)  


 


Band Neutrophils 0 % (0-8)  


 


Platelet Estimate Adequate  


 


Platelet Morphology Normal  


 


Acanthocytes 1+  


 


Erythrocyte Sedimentation Rate


  33 MM/HR


(0-20)  H


 


Reticulocyte Count


  0.7 %


(0.5-2.0)


 


Prothrombin Time


  10.4 SEC


(9.30-11.50)


 


Prothromb Time International


Ratio 1.0 (0.9-1.1)  


 


 


Activated Partial


Thromboplast Time 26 SEC (23-33)


 


 


Sodium Level


  140 MMOL/L


(136-145)


 


Potassium Level


  3.8 MMOL/L


(3.5-5.1)


 


Chloride Level


  108 MMOL/L


()  H


 


Carbon Dioxide Level


  27 MMOL/L


(21-32)


 


Anion Gap


  6 mmol/L


(5-15)


 


Blood Urea Nitrogen


  33 mg/dL


(7-18)  H


 


Creatinine


  1.7 MG/DL


(0.55-1.30)  H


 


Estimat Glomerular Filtration


Rate  mL/min (>60)  


 


 


Glucose Level


  88 MG/DL


()


 


Calcium Level


  8.2 MG/DL


(8.5-10.1)  L


 


Iron Level


  48 ug/dL


()  L


 


Total Iron Binding Capacity


  207 ug/dL


(250-450)  L


 


Percent Iron Saturation 23 % (15-50)  


 


Unsaturated Iron Binding


  159 ug/dL


(112-346)


 


Lactate Dehydrogenase


  174 U/L


()


 


Carcinoembryonic Antigen Pending  


 


Vitamin B12 Level


  242 PG/ML


(193-986)


 


Folate


  19.5 NG/ML


(8.6-58.9)











Microbiology








 Date/Time


Source Procedure


Growth Status


 


 


 11/18/19 14:10


Nasal Nares MRSA Culture - Final


NO METHICILLIN RESISTANT STAPH AUREUS... Complete


 


 11/18/19 14:10


Rectum VRE Culture - Final


NO VANCOMYCIN RESISTANT ENTEROCOCCUS ... Complete

















Intake and Output  


 


 11/19/19 11/20/19





 19:00 07:00


 


Intake Total 820 ml 


 


Output Total  400 ml


 


Balance 820 ml -400 ml


 


  


 


Intake Oral 820 ml 


 


Output Urine Total  400 ml








Objective


PHYSICAL EXAMINATION:


GENERAL:  The patient is a thin-appearing  male, who is


agitated.


HEENT:  Eyes, pupils are equal and responsive to light and accommodation.


Extraocular movements are intact.


NECK:  Supple without lymphadenopathy.


CHEST:  Lungs are clear to auscultation bilaterally without wheezes or


rales.


CARDIOVASCULAR:  Regular rhythm and rate.  S1 and S2 are normal without


murmurs, rubs, or gallops.


ABDOMEN:  Soft, nontender, and nondistended.  Positive bowel sounds.  No


evidence of hepatosplenomegaly.  Currently, no rebound or guarding


noted.


EXTREMITIES:  Negative for clubbing, cyanosis, or edema.


RECTAL/GENITAL:  Not performed.


NEUROLOGIC:  Cranial nerves II through XII are grossly intact without focal


deficits.





Assessment/Plan


Assessment/Plan


ASSESSMENT:  This is an 85-year-old  male.





1. Altered mental status.


2. Increased agitation.


3. Alzheimer's dementia with psychotic features.


4. Hypertension.


5. Cerebrovascular disease.


6. Left hemiplegia.





TREATMENT:


1. Altered mental status/increased agitation.  A psychiatric


consultation has been obtained with Dr. Randall.  We will follow


recommendations of Psychiatry.


2. Dementia with psychotic features.  As above, a psychiatric


consultation has been obtained with Dr. Randall.  We will follow


recommendations of Psychiatry.


3. Hypertension.  Continue amlodipine as above.


4. Cerebrovascular disease/left hemiplegia.











Piero Harman MD Nov 20, 2019 11:25

## 2019-11-20 NOTE — PULMONOLOGY PROGRESS NOTE
Assessment/Plan


Problems:  


(1) Acute encephalopathy


(2) History of hypertension


(3) History of CVA (cerebrovascular accident)


Assessment/Plan


no new complains


doing better


still confused


renal function fluctuating


no new complains





Subjective


ROS Limited/Unobtainable:  No


Constitutional:  Reports: no symptoms


HEENT:  Repors: no symptoms


Respiratory:  Reports: no symptoms


Allergies:  


Coded Allergies:  


     No Known Allergies (Unverified , 8/9/19)





Objective





Last 24 Hour Vital Signs








  Date Time  Temp Pulse Resp B/P (MAP) Pulse Ox O2 Delivery O2 Flow Rate FiO2


 


11/20/19 11:48 98.4 60 16 108/57 (74) 97   


 


11/20/19 09:00      Room Air  


 


11/20/19 08:24 96.8 75 14 152/75 (100) 98   


 


11/20/19 08:22    152/75    


 


11/20/19 08:21  75  152/75    


 


11/20/19 08:00 96.8 75 14 152/75 (100) 96   


 


11/20/19 04:00 98.6 55 20 127/58 (81) 96   


 


11/20/19 00:00 97.2 56 20 120/60 (80) 96   


 


11/19/19 21:00      Room Air  


 


11/19/19 20:00 96.7 54 20 123/57 (79) 96   


 


11/19/19 16:00 97.7 62 18 123/68 (86) 98   

















Intake and Output  


 


 11/19/19 11/20/19





 19:00 07:00


 


Intake Total 820 ml 


 


Output Total  400 ml


 


Balance 820 ml -400 ml


 


  


 


Intake Oral 820 ml 


 


Output Urine Total  400 ml








General Appearance:  WD/WN


HEENT:  normocephalic, atraumatic


Respiratory/Chest:  chest wall non-tender, normal breath sounds


Cardiovascular:  normal peripheral pulses, normal rate


Abdomen:  normal bowel sounds, soft, non tender


Genitourinary:  normal external genitalia


Extremities:  no clubbing


Skin:  no rash





Microbiology








 Date/Time


Source Procedure


Growth Status


 


 


 11/18/19 14:10


Nasal Nares MRSA Culture - Final


NO METHICILLIN RESISTANT STAPH AUREUS... Complete


 


 11/18/19 14:10


Rectum VRE Culture - Final


NO VANCOMYCIN RESISTANT ENTEROCOCCUS ... Complete








Laboratory Tests


11/20/19 07:20: 


White Blood Count 7.3, Red Blood Count 3.71L, Hemoglobin 9.9L, Hematocrit 30.7L

, Mean Corpuscular Volume 83, Mean Corpuscular Hemoglobin 26.6L, Mean 

Corpuscular Hemoglobin Concent 32.2, Red Cell Distribution Width 12.9, Platelet 

Count 210, Mean Platelet Volume 5.6L, Neutrophils (%) (Auto) 73.8, Lymphocytes (

%) (Auto) 13.0L, Monocytes (%) (Auto) 8.5, Eosinophils (%) (Auto) 3.8H, 

Basophils (%) (Auto) 0.9, Differential Total Cells Counted 100, Neutrophils % (

Manual) 83H, Lymphocytes % (Manual) 8L, Monocytes % (Manual) 5, Eosinophils % (

Manual) 3, Basophils % (Manual) 1, Band Neutrophils 0, Platelet Estimate 

Adequate, Platelet Morphology Normal, Acanthocytes 1+, Erythrocyte 

Sedimentation Rate 33H, Reticulocyte Count 0.7, Prothrombin Time 10.4, 

Prothromb Time International Ratio 1.0, Activated Partial Thromboplast Time 26, 

Sodium Level 140, Potassium Level 3.8, Chloride Level 108H, Carbon Dioxide 

Level 27, Anion Gap 6, Blood Urea Nitrogen 33H, Creatinine 1.7H, Estimat 

Glomerular Filtration Rate , Glucose Level 88, Calcium Level 8.2L, Iron Level 

48L, Total Iron Binding Capacity 207L, Percent Iron Saturation 23, Unsaturated 

Iron Binding 159, Lactate Dehydrogenase 174, Carcinoembryonic Antigen [Pending]

, Vitamin B12 Level 242, Folate 19.5





Current Medications








 Medications


  (Trade)  Dose


 Ordered  Sig/Arlen


 Route


 PRN Reason  Start Time


 Stop Time Status Last Admin


Dose Admin


 


 Acetaminophen


  (Tylenol)  650 mg  Q4H  PRN


 ORAL


 fever  11/18/19 12:15


 12/18/19 12:14   


 


 


 Amlodipine


 Besylate


  (Norvasc)  10 mg  DAILY


 ORAL


   11/19/19 09:00


 12/19/19 08:59  11/20/19 08:21


 


 


 Clonidine HCl


  (Catapres Tab)  0.1 mg  Q6H  PRN


 ORAL


 For High Blood Pressure  11/18/19 16:00


 12/18/19 15:59   


 


 


 Dextrose


  (Dextrose 50%)  25 ml  Q30M  PRN


 IV


 Hypoglycemia  11/18/19 12:15


 12/18/19 12:14   


 


 


 Dextrose


  (Dextrose 50%)  50 ml  Q30M  PRN


 IV


 Hypoglycemia  11/18/19 12:15


 12/18/19 12:14   


 


 


 Lisinopril


  (Prinivil)  20 mg  DAILY


 ORAL


   11/19/19 10:30


 12/19/19 10:29  11/20/19 08:22


 


 


 Lorazepam


  (Ativan 2mg/ml


 1ml)  0.5 mg  Q4H  PRN


 IV


 For Anxiety  11/18/19 12:15


 11/25/19 12:14   


 


 


 Morphine Sulfate


  (Morphine


 Sulfate)  1 mg  Q4H  PRN


 IVP


 For Pain  11/18/19 12:15


 11/25/19 12:14   


 


 


 Olanzapine


  (ZyPREXA)  5 mg  EVERY 4 HOURS  PRN


 ORAL


 agitation  11/19/19 22:45


 12/19/19 22:44   


 


 


 Ondansetron HCl


  (Zofran)  4 mg  Q6H  PRN


 IVP


 Nausea & Vomiting  11/18/19 12:15


 12/18/19 12:14   


 


 


 Polyethylene


 Glycol


  (Miralax)  17 gm  HSPRN  PRN


 ORAL


 Constipation  11/18/19 12:15


 12/18/19 12:14   


 


 


 Zolpidem Tartrate


  (Ambien)  5 mg  HSPRN  PRN


 ORAL


 Insomnia  11/18/19 12:15


 11/25/19 12:14   


 

















Kolton Tristan MD Nov 20, 2019 12:05

## 2019-11-20 NOTE — CONSULTATION
DATE OF CONSULTATION:  11/20/2019

CONSULTING PHYSICIAN:  Libby Randall M.D.



HISTORY OF PRESENT ILLNESS:  The patient is an 85-year-old, 

American male with a history of multiple medical comorbidities including

pneumonia, hypertension, anemia, renal failure, CVA who has been admitted

to the hospital for altered mental status and increased agitation.  The

patient was placed on risperidone 1.5 mg p.o. b.i.d. by Dr. Tristan.  The

medication was changed last night from risperidone to Zyprexa as it has

less cardiac side effects.  Today during the evaluation, the patient is

able to answer the questions.  More alert, more engaged, and less

confused.  No agitation was noted today.



PAST PSYCHIATRIC HISTORY:  Significant for encephalopathy in the past as

well as history of agitation.



PAST MEDICAL HISTORY:  Significant for CVA, left hemiparesis,

hypertension.



HOME PSYCHOTROPIC MEDICATIONS:  Include risperidone 1.5 mg p.o. twice a

day.



ALLERGIES:  No known drug allergies.



SUBSTANCE ABUSE HISTORY:  No known history of illicit drug use or

alcohol.



MENTAL STATUS EXAMINATION:  The patient is alert, oriented times self,

place.  Mood is less agitated.  Affect is constricted, congruent with

mood.  Thought process is concrete.  Thought content, no suicidal or

homicidal ideation.  Cognition is impaired.  Insight and judgment is

impaired.



ASSESSMENT:

Axis I  Dementia with behavior disturbance.

Acute encephalopathy, improved.

Axis II  Deferred.

Axis III  As above.

Axis IV  Low.

Axis V  20.



PLAN:

1. We will continue the patient on Zyprexa 5 mg every 4 hours p.r.n.

2. The patient should be discharged when medically cleared.









  ______________________________________________

  Libby Randall M.D.





DR:  BETTY

D:  11/20/2019 15:39

T:  11/20/2019 20:08

JOB#:  [id number]

CC:

## 2019-11-21 VITALS — DIASTOLIC BLOOD PRESSURE: 67 MMHG | SYSTOLIC BLOOD PRESSURE: 128 MMHG

## 2019-11-21 VITALS — SYSTOLIC BLOOD PRESSURE: 130 MMHG | DIASTOLIC BLOOD PRESSURE: 72 MMHG

## 2019-11-21 VITALS — DIASTOLIC BLOOD PRESSURE: 53 MMHG | SYSTOLIC BLOOD PRESSURE: 115 MMHG

## 2019-11-21 VITALS — DIASTOLIC BLOOD PRESSURE: 57 MMHG | SYSTOLIC BLOOD PRESSURE: 122 MMHG

## 2019-11-21 VITALS — SYSTOLIC BLOOD PRESSURE: 126 MMHG | DIASTOLIC BLOOD PRESSURE: 53 MMHG

## 2019-11-21 LAB
ADD MANUAL DIFF: NO
ANION GAP SERPL CALC-SCNC: 5 MMOL/L (ref 5–15)
BASOPHILS NFR BLD AUTO: 1.1 % (ref 0–2)
BUN SERPL-MCNC: 33 MG/DL (ref 7–18)
CALCIUM SERPL-MCNC: 8.4 MG/DL (ref 8.5–10.1)
CHLORIDE SERPL-SCNC: 109 MMOL/L (ref 98–107)
CO2 SERPL-SCNC: 26 MMOL/L (ref 21–32)
CREAT SERPL-MCNC: 1.6 MG/DL (ref 0.55–1.3)
EOSINOPHIL NFR BLD AUTO: 6.4 % (ref 0–3)
ERYTHROCYTE [DISTWIDTH] IN BLOOD BY AUTOMATED COUNT: 13 % (ref 11.6–14.8)
HCT VFR BLD CALC: 30.7 % (ref 42–52)
HGB BLD-MCNC: 9.9 G/DL (ref 14.2–18)
LYMPHOCYTES NFR BLD AUTO: 20.9 % (ref 20–45)
MCV RBC AUTO: 83 FL (ref 80–99)
MONOCYTES NFR BLD AUTO: 9.4 % (ref 1–10)
NEUTROPHILS NFR BLD AUTO: 62.1 % (ref 45–75)
PLATELET # BLD: 228 K/UL (ref 150–450)
POTASSIUM SERPL-SCNC: 4.2 MMOL/L (ref 3.5–5.1)
RBC # BLD AUTO: 3.71 M/UL (ref 4.7–6.1)
SODIUM SERPL-SCNC: 139 MMOL/L (ref 136–145)
WBC # BLD AUTO: 6.7 K/UL (ref 4.8–10.8)

## 2019-11-21 RX ADMIN — LISINOPRIL SCH MG: 20 TABLET ORAL at 09:00

## 2019-11-21 NOTE — PULMONOLOGY PROGRESS NOTE
Assessment/Plan


Problems:  


(1) Acute encephalopathy


(2) History of hypertension


(3) History of CVA (cerebrovascular accident)


Assessment/Plan


no new complains


doing better


still confused


renal function fluctuating


no new complains


dc to nursing home today





Subjective


ROS Limited/Unobtainable:  No


Constitutional:  Reports: no symptoms


HEENT:  Repors: no symptoms


Respiratory:  Reports: no symptoms


Allergies:  


Coded Allergies:  


     No Known Allergies (Unverified , 8/9/19)





Objective





Last 24 Hour Vital Signs








  Date Time  Temp Pulse Resp B/P (MAP) Pulse Ox O2 Delivery O2 Flow Rate FiO2


 


11/21/19 12:00 97.8 61 18 122/57 (78) 98   


 


11/21/19 09:13  53  126/53    


 


11/21/19 09:00      Room Air  


 


11/21/19 09:00    126/53    


 


11/21/19 08:00 97.4 53 18 126/53 (77) 98   


 


11/21/19 04:00 98.9 56 20 115/53 (73) 99   


 


11/21/19 00:00 99.5 81 20 128/67 (87) 97   


 


11/20/19 20:00 97.9 62 20 146/84 (104) 97   


 


11/20/19 19:34      Room Air  


 


11/20/19 15:58 97.9 60 20 108/68 (81) 97   

















Intake and Output  


 


 11/20/19 11/21/19





 19:00 07:00


 


Intake Total 840 ml 


 


Balance 840 ml 


 


  


 


Intake Oral 840 ml 


 


# Voids 3 2


 


# Bowel Movements 1 








General Appearance:  WD/WN


HEENT:  normocephalic, atraumatic


Respiratory/Chest:  chest wall non-tender, normal breath sounds


Cardiovascular:  normal peripheral pulses, normal rate, regularly irregular


Abdomen:  non distended


Extremities:  no clubbing


Skin:  no rash, no ulcers


Neurologic/Psychiatric:  CNs II-XII grossly normal





Microbiology








 Date/Time


Source Procedure


Growth Status


 


 


 11/18/19 14:10


Nasal Nares MRSA Culture - Final


NO METHICILLIN RESISTANT STAPH AUREUS... Complete


 


 11/18/19 14:10


Rectum VRE Culture - Final


NO VANCOMYCIN RESISTANT ENTEROCOCCUS ... Complete








Laboratory Tests


11/21/19 06:10: 


White Blood Count 6.7, Red Blood Count 3.71L, Hemoglobin 9.9L, Hematocrit 30.7L

, Mean Corpuscular Volume 83, Mean Corpuscular Hemoglobin 26.8L, Mean 

Corpuscular Hemoglobin Concent 32.4, Red Cell Distribution Width 13.0, Platelet 

Count 228, Mean Platelet Volume 5.8L, Neutrophils (%) (Auto) 62.1, Lymphocytes (

%) (Auto) 20.9, Monocytes (%) (Auto) 9.4, Eosinophils (%) (Auto) 6.4H, 

Basophils (%) (Auto) 1.1, Sodium Level 139, Potassium Level 4.2, Chloride Level 

109H, Carbon Dioxide Level 26, Anion Gap 5, Blood Urea Nitrogen 33H, Creatinine 

1.6H, Estimat Glomerular Filtration Rate , Glucose Level 87, Calcium Level 8.4L





Current Medications








 Medications


  (Trade)  Dose


 Ordered  Sig/Arlen


 Route


 PRN Reason  Start Time


 Stop Time Status Last Admin


Dose Admin


 


 Acetaminophen


  (Tylenol)  650 mg  Q4H  PRN


 ORAL


 fever  11/18/19 12:15


 12/18/19 12:14   


 


 


 Amlodipine


 Besylate


  (Norvasc)  10 mg  DAILY


 ORAL


   11/19/19 09:00


 12/19/19 08:59  11/21/19 09:13


 


 


 Clonidine HCl


  (Catapres Tab)  0.1 mg  Q6H  PRN


 ORAL


 For High Blood Pressure  11/18/19 16:00


 12/18/19 15:59   


 


 


 Dextrose


  (Dextrose 50%)  25 ml  Q30M  PRN


 IV


 Hypoglycemia  11/18/19 12:15


 12/18/19 12:14   


 


 


 Dextrose


  (Dextrose 50%)  50 ml  Q30M  PRN


 IV


 Hypoglycemia  11/18/19 12:15


 12/18/19 12:14   


 


 


 Lisinopril


  (Prinivil)  20 mg  DAILY


 ORAL


   11/19/19 10:30


 12/19/19 10:29  11/20/19 08:22


 


 


 Lorazepam


  (Ativan 2mg/ml


 1ml)  0.5 mg  Q4H  PRN


 IV


 For Anxiety  11/18/19 12:15


 11/25/19 12:14   


 


 


 Morphine Sulfate


  (Morphine


 Sulfate)  1 mg  Q4H  PRN


 IVP


 For Pain  11/18/19 12:15


 11/25/19 12:14   


 


 


 Olanzapine


  (ZyPREXA)  5 mg  EVERY 4 HOURS  PRN


 ORAL


 agitation  11/19/19 22:45


 12/19/19 22:44   


 


 


 Ondansetron HCl


  (Zofran)  4 mg  Q6H  PRN


 IVP


 Nausea & Vomiting  11/18/19 12:15


 12/18/19 12:14   


 


 


 Polyethylene


 Glycol


  (Miralax)  17 gm  HSPRN  PRN


 ORAL


 Constipation  11/18/19 12:15


 12/18/19 12:14   


 


 


 Zolpidem Tartrate


  (Ambien)  5 mg  HSPRN  PRN


 ORAL


 Insomnia  11/18/19 12:15


 11/25/19 12:14   


 

















Kolton Tristan MD Nov 21, 2019 13:21

## 2019-11-21 NOTE — INTERNAL MED PROGRESS NOTE
Subjective


Physician Name


Frandy Vuong


Attending Physician


Frandy Vuong MD





Current Medications








 Medications


  (Trade)  Dose


 Ordered  Sig/Arlen


 Route


 PRN Reason  Start Time


 Stop Time Status Last Admin


Dose Admin


 


 Acetaminophen


  (Tylenol)  650 mg  Q4H  PRN


 ORAL


 fever  11/18/19 12:15


 12/18/19 12:14   


 


 


 Amlodipine


 Besylate


  (Norvasc)  10 mg  DAILY


 ORAL


   11/19/19 09:00


 12/19/19 08:59  11/21/19 09:13


 


 


 Clonidine HCl


  (Catapres Tab)  0.1 mg  Q6H  PRN


 ORAL


 For High Blood Pressure  11/18/19 16:00


 12/18/19 15:59   


 


 


 Dextrose


  (Dextrose 50%)  25 ml  Q30M  PRN


 IV


 Hypoglycemia  11/18/19 12:15


 12/18/19 12:14   


 


 


 Dextrose


  (Dextrose 50%)  50 ml  Q30M  PRN


 IV


 Hypoglycemia  11/18/19 12:15


 12/18/19 12:14   


 


 


 Lisinopril


  (Prinivil)  20 mg  DAILY


 ORAL


   11/19/19 10:30


 12/19/19 10:29  11/20/19 08:22


 


 


 Lorazepam


  (Ativan 2mg/ml


 1ml)  0.5 mg  Q4H  PRN


 IV


 For Anxiety  11/18/19 12:15


 11/25/19 12:14   


 


 


 Morphine Sulfate


  (Morphine


 Sulfate)  1 mg  Q4H  PRN


 IVP


 For Pain  11/18/19 12:15


 11/25/19 12:14   


 


 


 Olanzapine


  (ZyPREXA)  5 mg  EVERY 4 HOURS  PRN


 ORAL


 agitation  11/19/19 22:45


 12/19/19 22:44   


 


 


 Ondansetron HCl


  (Zofran)  4 mg  Q6H  PRN


 IVP


 Nausea & Vomiting  11/18/19 12:15


 12/18/19 12:14   


 


 


 Polyethylene


 Glycol


  (Miralax)  17 gm  HSPRN  PRN


 ORAL


 Constipation  11/18/19 12:15


 12/18/19 12:14   


 


 


 Zolpidem Tartrate


  (Ambien)  5 mg  HSPRN  PRN


 ORAL


 Insomnia  11/18/19 12:15


 11/25/19 12:14   


 








Allergies:  


Coded Allergies:  


     No Known Allergies (Unverified , 8/9/19)


Subjective


Awake, alert, responsive, calm and relaxed, denies any chest pain or shortness 

of breath.





Objective





Last Vital Signs








  Date Time  Temp Pulse Resp B/P (MAP) Pulse Ox O2 Delivery O2 Flow Rate FiO2


 


11/21/19 12:00 97.8 61 18 122/57 (78) 98   


 


11/21/19 09:00      Room Air  











Laboratory Tests








Test


  11/21/19


06:10


 


White Blood Count


  6.7 K/UL


(4.8-10.8)


 


Red Blood Count


  3.71 M/UL


(4.70-6.10)  L


 


Hemoglobin


  9.9 G/DL


(14.2-18.0)  L


 


Hematocrit


  30.7 %


(42.0-52.0)  L


 


Mean Corpuscular Volume 83 FL (80-99)  


 


Mean Corpuscular Hemoglobin


  26.8 PG


(27.0-31.0)  L


 


Mean Corpuscular Hemoglobin


Concent 32.4 G/DL


(32.0-36.0)


 


Red Cell Distribution Width


  13.0 %


(11.6-14.8)


 


Platelet Count


  228 K/UL


(150-450)


 


Mean Platelet Volume


  5.8 FL


(6.5-10.1)  L


 


Neutrophils (%) (Auto)


  62.1 %


(45.0-75.0)


 


Lymphocytes (%) (Auto)


  20.9 %


(20.0-45.0)


 


Monocytes (%) (Auto)


  9.4 %


(1.0-10.0)


 


Eosinophils (%) (Auto)


  6.4 %


(0.0-3.0)  H


 


Basophils (%) (Auto)


  1.1 %


(0.0-2.0)


 


Sodium Level


  139 MMOL/L


(136-145)


 


Potassium Level


  4.2 MMOL/L


(3.5-5.1)


 


Chloride Level


  109 MMOL/L


()  H


 


Carbon Dioxide Level


  26 MMOL/L


(21-32)


 


Anion Gap


  5 mmol/L


(5-15)


 


Blood Urea Nitrogen


  33 mg/dL


(7-18)  H


 


Creatinine


  1.6 MG/DL


(0.55-1.30)  H


 


Estimat Glomerular Filtration


Rate  mL/min (>60)  


 


 


Glucose Level


  87 MG/DL


()


 


Calcium Level


  8.4 MG/DL


(8.5-10.1)  L

















Intake and Output  


 


 11/20/19 11/21/19





 19:00 07:00


 


Intake Total 840 ml 


 


Balance 840 ml 


 


  


 


Intake Oral 840 ml 


 


# Voids 3 2


 


# Bowel Movements 1 








Objective


General: No acute distress, awake and alert


HEENT: NCAT, sclera anicteric, PERRL, EOMI.


Neck: Supple, no significant jugular venous distention, 


Lungs: Fair inspiratory effort,  clear to auscultation bilaterally, no Wheeze 

or Rales.


Heart: Regular rate and rhythm, normal S1/S2, no murmurs


Abdomen: soft, nontender, nondistended. Normoactive bowel sounds.


 / Rectal: Refused and deferred.


Extremities: No Cyanosis , clubbing or edema. 


Neuro: A&O x 3, Able to move all extremities


Skin: warm, no rashes or lesions


Psych: Normal mood and affect





Assessment/Plan


Assessment/Plan





1. Altered mental status.


2. Increased agitation.


3. Alzheimer's dementia with psychotic features.


4. Hypertension.


5. Cerebrovascular disease.


6. Left hemiplegia.





TREATMENT:


1. Altered mental status/increased agitation.  A psychiatric


consultation has been obtained with Dr. Randall.  We will follow


recommendations of Psychiatry.


2. Dementia with psychotic features.  As above, a psychiatric


consultation has been obtained with Dr. Randall.  We will follow


recommendations of Psychiatry.


3. Hypertension.  Continue amlodipine as above.


4. Cerebrovascular disease/left hemiplegia.





Plan to discharge to the nursing home today to be followed by myself.











Frandy Vuong MD Nov 21, 2019 15:03

## 2019-11-22 NOTE — PROGRESS NOTE
DATE:  11/21/2019

SUBJECTIVE:  The patient is calm.  Able to answer the question, not

confused.  Would like to be discharged.  No episodes of agitation noted

today.



MENTAL STATUS EXAMINATION:  The patient is alert, oriented times self,

place, and situation.  Mood is neutral.  Affect is flat.  Thought process

is concrete.  Thought content, no suicidal or homicidal ideation.



ASSESSMENT:  Acute encephalopathy, improved.



PLAN:

1. The patient will be continued on current medications.

2. The patient will be discharged to nursing home.









  ______________________________________________

  Libby Randall M.D.





DR:  BETTY

D:  11/21/2019 23:40

T:  11/22/2019 04:59

JOB#:  [id number]

CC:

## 2019-11-22 NOTE — CARDIOLOGY REPORT
--------------- APPROVED REPORT --------------





EKG Measurement

Heart [id number]

VA [id number]

[id number]

[id number]

[id number]





Sinus bradycardia



Abnormal ECG

## 2019-11-22 NOTE — DISCHARGE SUMMARY
Discharge Summary


Discharge Summary


_


DATE OF ADMISSION: 11/18/2019





DATE OF DISCHARGE: 11/21/2019








DISCHARGED BY: Dr. Vuong





REASON FOR ADMISSION: 


85 years old male with past medical history of hypertension, CVA, psychosis 

resident of skilled nursing facility, was sent for evaluation due to increased 

agitation.  


Upon arrival patient was screaming and yelling.  


He was not taking his medication at the facility.  


Patient had prior history of psychosis.  


He denied hearing voices.  He denied suicidal or homicidal ideations.  


No fever or chills.  


No chest pain or shortness of breath.


Upon evaluation vital signs revealed elevated blood pressure 169/81 , heart 

rate 100 , patient was afebrile.  


Laboratory work-up revealed mild leukocytosis WBC 12.9, hemoglobin 11.2 , 

hematocrit 24.6.  Platelet count 233.  


Urinalysis revealed no evidence of urinary tract infection.  


Sodium 147, BUN 30, creatinine 1.5.  


Glucose 105.  


Stable LFT.  


Albumin 4.0.  


Urine toxicology screen was negative.  


Serum alcohol level was less than 3.  


Chest x-ray revealed  an ovoid opacity in the left lateral lung,  probably a 

prominent first subchondral junction , equally similar to the prior study.  





Patient subsequently admitted for further management. 














CONSULTANTS:


hospitalist Dr. Tristan


psychiatrist 


 


[facility] COURSE: 


Patient admitted to medical surgical floor.  


Patient started  on gentle IV hydration.  


Anxiolytic provided as needed.  


Blood pressure was managed with   antihypertensive medication regimen.


Patient initially was on beta-blocker and then changed to ACE inhibitor and 

amlodipine.  Clonidine was on board as needed for blood pressure spikes. 


Blood pressure stabilized with current regimen.  


Lipid panel revealed  elevated LDL of 119.  


Patient was counseled on low-fat low cardiac diet.  


Consider starting statin as outpatient,  given history of CVA


Psychiatrist  seen and evaluated patient.  





Per psychiatrist,  patient had  dementia with behavioral disturbances and acute 

encephalopathy.  


Psychiatric medication regimen was  optimized.  


Patient started on Zyprexa on as-needed basis.


SNF medication continued.  





Patient clinically stabilized and was ready for transfer back to skilled 

nursing facility for continuation of care.





FINAL DIAGNOSES: 


Acute encephalopathy


Dementia with behavioral disturbance


Alzheimer's dementia with psychotic features


Hypertension


Cerebrovascular disease with history of CVA and left hemiplegia


 


 





DISCHARGE MEDICATIONS:


See Medication Reconciliation list.





DISCHARGE INSTRUCTIONS:


Patient was discharged to the skilled nursing facility. 


Follow up with medical doctor at the facility.





I have been assigned to dictate discharge summary for this account. 


I was not involved in the patient's management.











Denise Gonzales NP Nov 22, 2019 08:59